# Patient Record
(demographics unavailable — no encounter records)

---

## 2024-10-10 NOTE — ASSESSMENT
[FreeTextEntry1] : Fissure did not respond to topical diltiazem.  Patient had perianal irritation.  50 units of Botox injected into internal sphincter on each side of the anterior fissure, for a total of 100 units.  Follow-up in 6 weeks if any residual symptoms.

## 2024-10-10 NOTE — HISTORY OF PRESENT ILLNESS
[FreeTextEntry1] : Rody is a 28 y/o female here for a follow-up visit, botox injection  Has a fissure initially in 2021 and took an entire year to heal - used nitroglycerin with no relief (headaches) and lidocaine cream (allergy). Had a skin tag as a result. s/p skin tag excision in April 2024 by Dr. Lenin Ashley - has been diagnosed with fissure afterwards. Was given Nifedipine cream with little relief.  Last seen 8/1/24 - I have seen and evaluated patient and I have corroborated all nursing input into this note. Patient had a previous anal fissure which healed after 1 year of topical therapy. She had surgical excision of the associated tag which then resulted in recurrence of the fissure. Topical therapy has been ineffective for the recurrence. The fissure had exposed internal sphincter on exam today which is a sign of chronicity. I recommended a trial of Botox injections as the next step in treatment. Indications, risks, benefits, alternatives reviewed including but not limited to bleeding, infection, failure, and temporary sphincter weakness. All questions were answered. Ultimately, if conservative treatment is unsuccessful and the patient's pain continues to be significant then surgery will be needed.  Today pt reports no pain. Daily BMs, formed, sometimes straining, takes stool softener daily, pain lingers sometimes all day, with some blood onto tp, last week had drips onto toilet bowl, takes sitz bath.  No episodes of incontinence.  Feels swollen tissue.  Not taking any anticoagulants.  Botox Injection Lot # [id number] exp 2026/10

## 2024-10-10 NOTE — PHYSICAL EXAM
[FreeTextEntry1] : Perianal inspection demonstrated a small anterior tag and fissure with exposed internal sphincter.

## 2025-02-12 NOTE — PHYSICAL EXAM
[No Acute Distress] : no acute distress [Well Nourished] : well nourished [Well Developed] : well developed [Well-Appearing] : well-appearing [Normal Sclera/Conjunctiva] : normal sclera/conjunctiva [PERRL] : pupils equal round and reactive to light [EOMI] : extraocular movements intact [Normal Outer Ear/Nose] : the outer ears and nose were normal in appearance [Normal Oropharynx] : the oropharynx was normal [Normal TMs] : both tympanic membranes were normal [No JVD] : no jugular venous distention [No Lymphadenopathy] : no lymphadenopathy [Supple] : supple [Thyroid Normal, No Nodules] : the thyroid was normal and there were no nodules present [No Respiratory Distress] : no respiratory distress  [No Accessory Muscle Use] : no accessory muscle use [Clear to Auscultation] : lungs were clear to auscultation bilaterally [Normal Rate] : normal rate  [Regular Rhythm] : with a regular rhythm [Normal S1, S2] : normal S1 and S2 [No Murmur] : no murmur heard [No Carotid Bruits] : no carotid bruits [No Abdominal Bruit] : a ~M bruit was not heard ~T in the abdomen [No Varicosities] : no varicosities [Pedal Pulses Present] : the pedal pulses are present [No Edema] : there was no peripheral edema [No Palpable Aorta] : no palpable aorta [No Extremity Clubbing/Cyanosis] : no extremity clubbing/cyanosis [Soft] : abdomen soft [Non Tender] : non-tender [Non-distended] : non-distended [No Masses] : no abdominal mass palpated [No HSM] : no HSM [Normal Bowel Sounds] : normal bowel sounds [Normal Posterior Cervical Nodes] : no posterior cervical lymphadenopathy [Normal Anterior Cervical Nodes] : no anterior cervical lymphadenopathy [No CVA Tenderness] : no CVA  tenderness [No Spinal Tenderness] : no spinal tenderness [No Joint Swelling] : no joint swelling [Grossly Normal Strength/Tone] : grossly normal strength/tone [No Rash] : no rash [Coordination Grossly Intact] : coordination grossly intact [No Focal Deficits] : no focal deficits [Normal Affect] : the affect was normal [Normal Insight/Judgement] : insight and judgment were intact

## 2025-02-20 NOTE — PLAN
[FreeTextEntry1] :  #Preoperative examination -CBC w. diff, CMP, PTT, PT/INR, B-HCG, UA + micro -EKG done today in office- RSR' in V1 and V2 -Cardiology follow up   Labs drawn in office

## 2025-02-20 NOTE — HISTORY OF PRESENT ILLNESS
[(Patient denies any chest pain, claudication, dyspnea on exertion, orthopnea, palpitations or syncope)] : Patient denies any chest pain, claudication, dyspnea on exertion, orthopnea, palpitations or syncope [Good (7-10 METs)] : Good (7-10 METs) [Aortic Stenosis] : no aortic stenosis [Atrial Fibrillation] : no atrial fibrillation [Coronary Artery Disease] : no coronary artery disease [Recent Myocardial Infarction] : no recent myocardial infarction [Implantable Device/Pacemaker] : no implantable device/pacemaker [Asthma] : no asthma [COPD] : no COPD [Sleep Apnea] : no sleep apnea [Smoker] : not a smoker [Family Member] : no family member with adverse anesthesia reaction/sudden death [Self] : no previous adverse anesthesia reaction [Chronic Anticoagulation] : no chronic anticoagulation [Chronic Kidney Disease] : no chronic kidney disease [Diabetes] : no diabetes [FreeTextEntry1] : botox injecton  [FreeTextEntry2] : 02/26/2025  [FreeTextEntry3] : Dr. Scott Born  [FreeTextEntry4] : 29 y/o female presents for preoperative examination prior to botox injection for retrograde cricopharyngeus dysfunction. She reports being unable to belch and having abdominal bloating. LMP-02/05/2025

## 2025-02-20 NOTE — ASSESSMENT
[High Risk Surgery - Intraperitoneal, Intrathoracic or Supringuinal Vascular Procedures] : High Risk Surgery - Intraperitoneal, Intrathoracic or Supringuinal Vascular Procedures - No (0) [Ischemic Heart Disease] : Ischemic Heart Disease - No (0) [Congestive Heart Failure] : Congestive Heart Failure - No (0) [Prior Cerebrovascular Accident or TIA] : Prior Cerebrovascular Accident or TIA - No (0) [Creatinine >= 2mg/dL (1 Point)] : Creatinine >= 2mg/dL - No (0) [Insulin-dependent Diabetic (1 Point)] : Insulin-dependent Diabetic - No (0) [0] : 0 , RCRI Class: I, Risk of Post-Op Cardiac Complications: 3.9%, 95% CI for Risk Estimate: 2.8% - 5.4% [Patient Optimized for Surgery] : Patient optimized for surgery [FreeTextEntry4] : 27 y/o female presents for preoperative examination prior to botox injection for retrograde cricopharyngeus dysfunction.

## 2025-02-20 NOTE — ASSESSMENT
[High Risk Surgery - Intraperitoneal, Intrathoracic or Supringuinal Vascular Procedures] : High Risk Surgery - Intraperitoneal, Intrathoracic or Supringuinal Vascular Procedures - No (0) [Ischemic Heart Disease] : Ischemic Heart Disease - No (0) [Congestive Heart Failure] : Congestive Heart Failure - No (0) [Prior Cerebrovascular Accident or TIA] : Prior Cerebrovascular Accident or TIA - No (0) [Creatinine >= 2mg/dL (1 Point)] : Creatinine >= 2mg/dL - No (0) [Insulin-dependent Diabetic (1 Point)] : Insulin-dependent Diabetic - No (0) [0] : 0 , RCRI Class: I, Risk of Post-Op Cardiac Complications: 3.9%, 95% CI for Risk Estimate: 2.8% - 5.4% [Patient Optimized for Surgery] : Patient optimized for surgery [FreeTextEntry4] : 29 y/o female presents for preoperative examination prior to botox injection for retrograde cricopharyngeus dysfunction.

## 2025-02-24 NOTE — HISTORY OF PRESENT ILLNESS
[FreeTextEntry1] : 28-year-old woman, with a past medical history of ADHD and pterygium, presenting for follow up.  Patient is planned to go a Botox injection to correct RCPD (Retrograde Cricopharyngeal Dysfunction). She presently denies chest pain, shortness of breath/dyspnea on exertion, palpitations, dizziness/loss of consciousness, paroxysmal nocturnal dyspnea, orthopnea, headaches, abdominal pain, and lower extremity swelling; and is able to ambulate >10 blocks and >2 flights of stairs without inhibition by chest pain or dyspnea on exertion.

## 2025-02-24 NOTE — ASSESSMENT
[FreeTextEntry1] : 28-year-old woman, with a past medical history of pterygium, presenting for follow up.   Preoperative Cardiovascular Assessment: Patient is planned to go a Botox injection to correct RCPD (Retrograde Cricopharyngeal Dysfunction). She presently denies chest pain, shortness of breath/dyspnea on exertion, palpitations, dizziness/loss of consciousness, paroxysmal nocturnal dyspnea, orthopnea, headaches, abdominal pain, and lower extremity swelling; and is able to ambulate >10 blocks and >2 flights of stairs without inhibition by chest pain or dyspnea on exertion. Physical exam and ECG are unremarkable. - RCRI Score 0, Class I risk: 3.9% 30-day risk of death, MI, or cardiac arrest - patient able to achieve >4 METs w/o anginal symptoms or anginal equivalents - patient is at low risk for an intermediate risk surgery - no cardiac contraindications to the planned procedure  Elevated Triglycerides: Latest lipid panel in March 2024 within normal limits. - reassess lipid profile in 1 year  F/u PRN.

## 2025-03-18 NOTE — END OF VISIT
[FreeTextEntry3] : I, Honey Galicia, acted as a scribe on behalf of Dr. Honey Gustafson M.D. on 03/18/2025 .   All medical entries made by the scribe were at my Dr. Honey Gustafson M.D direction and personally dictated by me on  03/18/2025 . I have reviewed the chart and agree that the record accurately reflects my personal performance of the history, physical exam, assessment and plan. I have also personally directed, reviewed, and agreed with the chart.

## 2025-03-18 NOTE — PLAN
[FreeTextEntry1] :  28 year old G0 female presents for annual exam.  -Pap UTD, repeat next year -Refill OCP  -RTO 1 year for annual
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
## 2025-06-12 NOTE — PLAN
95
[FreeTextEntry1] :   #Allergic asthma -Start symbicort BID   #Rhonci on exam -Z-pack x 5 days if no improvement in chest congestion -Fluconazole 100 mg once and repeat in 3 days if no improvement   F/u in 2 weeks or prn 

## 2025-06-12 NOTE — PHYSICAL EXAM
[No Acute Distress] : no acute distress [Well Developed] : well developed [Well-Appearing] : well-appearing [Normal Sclera/Conjunctiva] : normal sclera/conjunctiva [EOMI] : extraocular movements intact [Supple] : supple [No Respiratory Distress] : no respiratory distress  [No Accessory Muscle Use] : no accessory muscle use [Clear to Auscultation] : lungs were clear to auscultation bilaterally [Normal Rate] : normal rate  [Regular Rhythm] : with a regular rhythm [Normal S1, S2] : normal S1 and S2 [Grossly Normal Strength/Tone] : grossly normal strength/tone [No Focal Deficits] : no focal deficits [Normal Affect] : the affect was normal [Normal Insight/Judgement] : insight and judgment were intact [de-identified] : myrna

## 2025-06-12 NOTE — HISTORY OF PRESENT ILLNESS
[FreeTextEntry1] : low ferritin  [de-identified] : 27 y/o female presents due to concern for low ferritin. She saw endo in 03/2025 and states she had low ferritin lab result of 14. Pt states she has increased red meat and spinach and has decreased intake of meat protein due to GI issues. She c/o light headedness with standing. She reports chest congestion that has been present for weeks and is improving. She attributes to allergist and saw allergist. Pt is taking zyrtec and clarinex. No fever, no chest pain or shortness of breath and reports bronchospasm.  Also reports occasional random chills. She has not used albuterol recently.

## 2025-06-12 NOTE — ASSESSMENT
[FreeTextEntry1] : 27 y/o female presents with concern regarding low ferritin and reports chest congestion and has notable rhonci on exam.

## 2025-06-12 NOTE — HISTORY OF PRESENT ILLNESS
[FreeTextEntry1] : low ferritin  [de-identified] : 27 y/o female presents due to concern for low ferritin. She saw endo in 03/2025 and states she had low ferritin lab result of 14. Pt states she has increased red meat and spinach and has decreased intake of meat protein due to GI issues. She c/o light headedness with standing. She reports chest congestion that has been present for weeks and is improving. She attributes to allergist and saw allergist. Pt is taking zyrtec and clarinex. No fever, no chest pain or shortness of breath and reports bronchospasm.  Also reports occasional random chills. She has not used albuterol recently.

## 2025-06-12 NOTE — PHYSICAL EXAM
[No Acute Distress] : no acute distress [Well Developed] : well developed [Well-Appearing] : well-appearing [Normal Sclera/Conjunctiva] : normal sclera/conjunctiva [EOMI] : extraocular movements intact [Supple] : supple [No Respiratory Distress] : no respiratory distress  [No Accessory Muscle Use] : no accessory muscle use [Clear to Auscultation] : lungs were clear to auscultation bilaterally [Normal Rate] : normal rate  [Regular Rhythm] : with a regular rhythm [Normal S1, S2] : normal S1 and S2 [Grossly Normal Strength/Tone] : grossly normal strength/tone [No Focal Deficits] : no focal deficits [Normal Affect] : the affect was normal [Normal Insight/Judgement] : insight and judgment were intact [de-identified] : myrna

## 2025-06-12 NOTE — ASSESSMENT
[FreeTextEntry1] : 29 y/o female presents with concern regarding low ferritin and reports chest congestion and has notable rhonci on exam.

## 2025-06-12 NOTE — HISTORY OF PRESENT ILLNESS
[FreeTextEntry1] : low ferritin  [de-identified] : 27 y/o female presents due to concern for low ferritin. She saw endo in 03/2025 and states she had low ferritin lab result of 14. Pt states she has increased red meat and spinach and has decreased intake of meat protein due to GI issues. She c/o light headedness with standing. She reports chest congestion that has been present for weeks and is improving. She attributes to allergist and saw allergist. Pt is taking zyrtec and clarinex. No fever, no chest pain or shortness of breath and reports bronchospasm.  Also reports occasional random chills. She has not used albuterol recently.

## 2025-06-12 NOTE — PHYSICAL EXAM
[No Acute Distress] : no acute distress [Well Developed] : well developed [Well-Appearing] : well-appearing [Normal Sclera/Conjunctiva] : normal sclera/conjunctiva [EOMI] : extraocular movements intact [Supple] : supple [No Respiratory Distress] : no respiratory distress  [No Accessory Muscle Use] : no accessory muscle use [Clear to Auscultation] : lungs were clear to auscultation bilaterally [Normal Rate] : normal rate  [Regular Rhythm] : with a regular rhythm [Normal S1, S2] : normal S1 and S2 [Grossly Normal Strength/Tone] : grossly normal strength/tone [No Focal Deficits] : no focal deficits [Normal Affect] : the affect was normal [Normal Insight/Judgement] : insight and judgment were intact [de-identified] : myrna

## 2025-07-29 NOTE — HEALTH RISK ASSESSMENT
[No] : No [Yes] : In the past 12 months have you used drugs other than those required for medical reasons? Yes [0] : 2) Feeling down, depressed, or hopeless: Not at all (0) [PHQ-2 Negative - No further assessment needed] : PHQ-2 Negative - No further assessment needed [Never] : Never [Patient reported PAP Smear was normal] : Patient reported PAP Smear was normal [HIV test declined] : HIV test declined [Hepatitis C test declined] : Hepatitis C test declined [de-identified] : marijuana use [de-identified] : exercises and plays soccer  [de-identified] : normal, no restrictions  [[id number]] : 0 [PapSmearDate] : 2023 [HIVDate] : 05/20 [HepatitisCDate] : 05/20

## 2025-07-29 NOTE — PLAN
[FreeTextEntry1] :  #HM -CMP, CBC w. diff, Lipid, A1C, TSH w. rFT4, UA + micro -Use of sunscreen -Physical activity 3-5x/week for at least 30 minutes recommended -Healthy diet and lifestyle recommended including diet low in processed foods and saturated fats and high in vegetables and whole grains -Derm annually -GYN annually -Annual dental and vision exam recommended -Flu shot annually recommended -Tdap q10 years recommended- pt will return for tdap  Pt would like to check vitamin D level and ferritin- will check with insurance for coverage (low ferritin- 14 on prior lab, vitamin D test 1,25- 85, vitamin D 25 [place]- 32) vitamin b12-719, folate-13.5 [facility] labs with [facility] 04/2025

## 2025-07-29 NOTE — ASSESSMENT
[FreeTextEntry1] : 29 y/o female presents for comprehensive physical examination. [Vaccines Reviewed] : Immunizations reviewed today. Please see immunization details in the vaccine log within the immunization flowsheet.

## 2025-07-29 NOTE — HISTORY OF PRESENT ILLNESS
[FreeTextEntry1] : comprehensive physical examination [de-identified] : 29 y/o female presents for comprehensive physical examination. No acute complaints reported. Pt requesting to check ferritin and vitamin D levels.